# Patient Record
Sex: FEMALE | Race: BLACK OR AFRICAN AMERICAN | NOT HISPANIC OR LATINO | Employment: STUDENT | URBAN - METROPOLITAN AREA
[De-identification: names, ages, dates, MRNs, and addresses within clinical notes are randomized per-mention and may not be internally consistent; named-entity substitution may affect disease eponyms.]

---

## 2018-11-24 ENCOUNTER — HOSPITAL ENCOUNTER (EMERGENCY)
Facility: OTHER | Age: 23
Discharge: HOME | End: 2018-11-24
Attending: EMERGENCY MEDICINE

## 2018-11-24 ENCOUNTER — HOSPITAL ENCOUNTER (EMERGENCY)
Facility: OTHER | Age: 23
Discharge: LEFT BEFORE DISCHARGE | End: 2018-11-24
Attending: EMERGENCY MEDICINE

## 2018-11-24 VITALS
RESPIRATION RATE: 15 BRPM | SYSTOLIC BLOOD PRESSURE: 110 MMHG | BODY MASS INDEX: 22.66 KG/M2 | HEART RATE: 68 BPM | DIASTOLIC BLOOD PRESSURE: 71 MMHG | TEMPERATURE: 99 F | OXYGEN SATURATION: 97 % | HEIGHT: 61 IN | WEIGHT: 120 LBS

## 2018-11-24 VITALS
RESPIRATION RATE: 20 BRPM | WEIGHT: 120 LBS | TEMPERATURE: 98 F | DIASTOLIC BLOOD PRESSURE: 79 MMHG | HEIGHT: 63 IN | HEART RATE: 98 BPM | SYSTOLIC BLOOD PRESSURE: 138 MMHG | BODY MASS INDEX: 21.26 KG/M2 | OXYGEN SATURATION: 100 %

## 2018-11-24 DIAGNOSIS — F41.9 ANXIETY: Primary | ICD-10-CM

## 2018-11-24 DIAGNOSIS — R07.9 CHEST PAIN: ICD-10-CM

## 2018-11-24 PROCEDURE — 93005 ELECTROCARDIOGRAM TRACING: CPT

## 2018-11-24 PROCEDURE — 99283 EMERGENCY DEPT VISIT LOW MDM: CPT | Mod: 25

## 2018-11-24 PROCEDURE — 99283 EMERGENCY DEPT VISIT LOW MDM: CPT | Mod: 27

## 2018-11-24 PROCEDURE — 93010 ELECTROCARDIOGRAM REPORT: CPT | Mod: ,,, | Performed by: INTERNAL MEDICINE

## 2018-11-24 PROCEDURE — 25000003 PHARM REV CODE 250: Performed by: EMERGENCY MEDICINE

## 2018-11-24 RX ORDER — HYDROXYZINE PAMOATE 25 MG/1
25 CAPSULE ORAL
Status: COMPLETED | OUTPATIENT
Start: 2018-11-24 | End: 2018-11-24

## 2018-11-24 RX ORDER — HYDROXYZINE PAMOATE 25 MG/1
25 CAPSULE ORAL 4 TIMES DAILY
COMMUNITY

## 2018-11-24 RX ADMIN — HYDROXYZINE PAMOATE 25 MG: 25 CAPSULE ORAL at 05:11

## 2018-11-24 NOTE — ED NOTES
Pt reports feeling much better after medication administration and is requesting to be discharged at this time, MD aware of current status. Pt  at bedside and reports feeling comfortable for pt to be discharged into his care.

## 2018-11-24 NOTE — ED NOTES
"Pt coming back to the ER. Registration and security called charge nurse as pt is lying on the floor in the ER lobby. Pt is assisted off the floor with boyfriend.   Charge RN at this time is trying to speak with pt and trying to see if she wants to be seen by an MD and wants the services of the ER. Pt is only crying into boyfriend's chest at this time and not responding to staff  Pt's boyfriend asking if the pt needs help and and pt mumbles 'My chest hurts"  RN said if she is able to speak with MD she will be more then willing to help pt and give her a proper assessment.  Charge RN asked pt "Do you want to see a doctor"  Pt shook head no and then punches hands together and storms out of the ER aggressively pushing doors open and running out of ER  Boyfriend is told that pt can't be held in the ER against her will and if she wants to leave and she chooses to then the staff can not force her to stay, but will be willing to see pt if she comes back and is able to talk to MD   "

## 2018-11-24 NOTE — ED NOTES
MD at bedside for second attempt to complete assessment, pt continues to refuse to answer any questions at this time. Pt becomes aggressive removing pulse oximetry, blood pressure cuff and cardiac monitoring equipment. MD again instructs pt to answer questions to complete assessment. Pt then begins to exit bed without assistance needed, slams door open and ambulates down loew with steady gait to exit premise.

## 2018-11-24 NOTE — ED PROVIDER NOTES
Encounter Date: 11/24/2018    SCRIBE #1 NOTE: ITammy, am scribing for, and in the presence of, Dr. Hernnadez.       History     Chief Complaint   Patient presents with    Anxiety     Pt refusing to answer questions in triage, boyfriend reports history of anxiety.      Time seen by provider: 5:05 AM     This is a 23 y.o. female who presents with complaint of panic attack that began earlier today. Patient reports panic attack started with squeezing chest pain followed by shortness of breath while walking. Patient denies any triggers tonight. Patient's boyfriend reports history of anxiety, with this being the second episode this year. Patient reports headache which is normally associated with post panic attack. Patient admits to not being compliant with Vistaril. Patient denies any other long term medical problems. Patient denies any consumption of alcohol or illicit drugs last night.        The history is provided by the patient and a significant other.     Review of patient's allergies indicates:  No Known Allergies  Past Medical History:   Diagnosis Date    Anxiety      History reviewed. No pertinent surgical history.  History reviewed. No pertinent family history.  Social History     Tobacco Use    Smoking status: Unknown If Ever Smoked   Substance Use Topics    Alcohol use: No     Frequency: Never    Drug use: Not on file     Review of Systems   Constitutional: Negative for fever.   HENT: Negative for congestion and sore throat.    Respiratory: Positive for shortness of breath.    Cardiovascular: Positive for chest pain.   Gastrointestinal: Negative for diarrhea, nausea and vomiting.   Genitourinary: Negative for dysuria.   Musculoskeletal: Negative for back pain.   Skin: Negative for rash.   Neurological: Positive for headaches. Negative for weakness.   Hematological: Does not bruise/bleed easily.   Psychiatric/Behavioral: The patient is nervous/anxious.        Physical Exam     Initial Vitals [11/24/18  0501]   BP Pulse Resp Temp SpO2   119/80 76 18 99 °F (37.2 °C) 98 %      MAP       --         Physical Exam    Nursing note and vitals reviewed.  Constitutional: She appears well-developed and well-nourished. She is not diaphoretic. No distress.   Calm. Cooperative.   HENT:   Head: Normocephalic and atraumatic.   Eyes: Conjunctivae and EOM are normal. No scleral icterus.   Neck: Normal range of motion. Neck supple.   Cardiovascular: Normal rate, regular rhythm and normal heart sounds. Exam reveals no gallop and no friction rub.    No murmur heard.  Pulmonary/Chest: Breath sounds normal. No respiratory distress. She has no wheezes. She has no rhonchi. She has no rales.   Musculoskeletal: Normal range of motion. She exhibits no edema or tenderness.   Neurological: She is alert and oriented to person, place, and time.   Skin: Skin is warm and dry. No rash noted. No erythema. No pallor.   Psychiatric: She has a normal mood and affect. Her behavior is normal. Judgment and thought content normal.         ED Course   Procedures  Labs Reviewed - No data to display  EKG Readings: (Independently Interpreted)   Initial Reading: No STEMI.   Normal sinus rhythm. Rate of 67 bpm. No ST-T wave changes.       Imaging Results    None          Medical Decision Making:   Clinical Tests:   Medical Tests: Ordered and Reviewed    Additional MDM:   Comments: 23-year-old female with history of anxiety presents for her 2nd visit a tonight.  She initially walked out of the emergency department without being evaluated.  She since returned with her boyfriend much calmer.  She reports chest tightness and a headache which she states she typically experiences with her anxiety attacks.  Etiology of this anxiety attack is unknown based on her history.  She does report taking Vistaril as needed for anxiety. She was given Vistaril and an EKG was obtained.  EKG showed no evidence of dysrhythmia or acute ischemic changes.  Upon reassessment the patient  reported feeling much better and ready for discharge after the Vistaril.  Patient will be discharged at this time stable condition with her boyfriend  follow up with her primary care doctor as needed when she returns home..          Scribe Attestation:   Scribe #1: I performed the above scribed service and the documentation accurately describes the services I performed. I attest to the accuracy of the note.    Attending Attestation:           Physician Attestation for Scribe:  Physician Attestation Statement for Scribe #1: I, Dr. Hernandez, reviewed documentation, as scribed by Tammy Rose in my presence, and it is both accurate and complete.                    Clinical Impression:     1. Anxiety    2. Chest pain                                   Galina Hernandez MD  11/24/18 [id number]

## 2018-11-24 NOTE — ED NOTES
"MD on phone with pts emergency contact at this time, SO states "shes having a panic attack and I am trying to get her to come back in and talk to you guys". Pt in waiting room with boyfriend, charge nurse attempts to speak again to pt. Pt continues to refuse and then begins to storm out of ER lobby to ER ramp. Security present in lobby and made aware of current situation. MD also made aware of current status.   "

## 2018-11-24 NOTE — ED NOTES
Pt presented to ED for second occurrence today for panic attack. Pt reports hx of anxiety with this being the second occurrence of panic attack symptoms. On arrival second time to ED pt appears calm and cooperative RR easy non labored, NAD. AAOx4, VSS, answers all questions appropriately, boyfriend at bedside. Side rails up x2, call light within reach, bed in lowest locked position, will continue to monitor and assess for changes.

## 2018-11-24 NOTE — ED PROVIDER NOTES
Encounter Date: 11/24/2018       History     Chief Complaint   Patient presents with    Anxiety     Pt came to the ED tonight c.o. anxiety with hx of panic attacks. pt is having spasms and chest tightness at this time.      22 y/o female BIB EMS from a hotel with reported anxiety attack.  Per EMS, there were multiple people with her at the hotel.  When she arrived she was screaming and hyperventilating.  When I walked in she was rocking back and forth in the stretcher.  She refused to answer any questions and pulled off her pulse ox.  After multiple attempts by me and her nurse to have her communicate with us what she was feeling, I explained to her that I could not help her if she did not speak to us.  At that point she got up from the stretcher, swung the door open and stormed out of the ED.          I did speak to her boyfriend, who was in the waiting room at the time, on the phone after she stormed out of the ED and he said she was having an anxiety attack but could not provide any further details.  I explained to him that we were happy to evaluate and treat her but we could not help her if she refused to cooperate and he said he would try to convince to come back into the ED.            Review of patient's allergies indicates:  No Known Allergies  No past medical history on file.  No past surgical history on file.  No family history on file.  Social History     Tobacco Use    Smoking status: Not on file   Substance Use Topics    Alcohol use: Not on file    Drug use: Not on file     Review of Systems   Unable to perform ROS: Other       Physical Exam     Initial Vitals [11/24/18 0426]   BP Pulse Resp Temp SpO2   138/79 108 (!) 26 98.3 °F (36.8 °C) 100 %      MAP       --         Physical Exam    Vitals reviewed.  Constitutional: She appears well-developed and well-nourished.   Periodically hyperventilating, rocking in bed and clenching her fists   Musculoskeletal: Normal range of motion.   Gait normal    Neurological: She is alert.         ED Course   Procedures  Labs Reviewed - No data to display       Imaging Results    None             Additional MDM:   Comments: Patient presented for a reported anxiety attack but refused to cooperate with our history and exam.  She appeared more angry than anxious.  She stormed out of the ED swinging the room door so hard she nearly hit the person standing behind it.  She subsequently verbalized she did not want our help once in the waiting room in front of her boyfriend and charge nurse and slammed the door exiting the ED.     .                    Clinical Impression:     1. Anxiety                                 Galina Hernandez MD  11/24/18 7338

## 2018-11-24 NOTE — ED NOTES
Pt very uncooperative and refuses to answer any questions at this time. MD aware of current status